# Patient Record
Sex: MALE | Race: WHITE | NOT HISPANIC OR LATINO | URBAN - METROPOLITAN AREA
[De-identification: names, ages, dates, MRNs, and addresses within clinical notes are randomized per-mention and may not be internally consistent; named-entity substitution may affect disease eponyms.]

---

## 2020-08-17 ENCOUNTER — OFFICE (OUTPATIENT)
Dept: URBAN - METROPOLITAN AREA CLINIC 11 | Facility: CLINIC | Age: 45
End: 2020-08-17

## 2020-08-17 VITALS
WEIGHT: 212 LBS | SYSTOLIC BLOOD PRESSURE: 115 MMHG | HEART RATE: 52 BPM | OXYGEN SATURATION: 99 % | DIASTOLIC BLOOD PRESSURE: 64 MMHG | HEIGHT: 73 IN

## 2020-08-17 DIAGNOSIS — K62.89 OTHER SPECIFIED DISEASES OF ANUS AND RECTUM: ICD-10-CM

## 2020-08-17 PROCEDURE — 99202 OFFICE O/P NEW SF 15 MIN: CPT

## 2020-08-17 NOTE — SERVICEHPINOTES
Mr. Griffin is a 44 y/o WM who presents today to discuss colon cancer screening. Over the past few years he has had several friends diagnosed with colon cancer before the age of 50. He has no family history of colon cancer or polyps. He has no change in weight or bowel pattern. There is no melena or hematochezia. He is on overall good health. About once a month he has a brief rectal pain, described as spasm that lasts only a few seconds. Impression/Plan:BR-Rectal pain consistent with proctalgia fugax.BR-Discussed annual hemosures until he turns 49 for colon cancer screening. If positive, will do diagnostic colonoscopy.

## 2021-04-08 ENCOUNTER — OFFICE (OUTPATIENT)
Dept: URBAN - METROPOLITAN AREA CLINIC 11 | Facility: CLINIC | Age: 46
End: 2021-04-08
Payer: COMMERCIAL

## 2021-04-08 DIAGNOSIS — K62.89 OTHER SPECIFIED DISEASES OF ANUS AND RECTUM: ICD-10-CM

## 2021-04-08 PROCEDURE — 82274 ASSAY TEST FOR BLOOD FECAL: CPT | Mod: QW

## 2023-08-28 ENCOUNTER — AMBULATORY SURGICAL CENTER (OUTPATIENT)
Dept: URBAN - METROPOLITAN AREA SURGERY 3 | Facility: SURGERY | Age: 48
End: 2023-08-28
Payer: COMMERCIAL

## 2023-08-28 ENCOUNTER — OFFICE (OUTPATIENT)
Dept: URBAN - METROPOLITAN AREA PATHOLOGY 12 | Facility: PATHOLOGY | Age: 48
End: 2023-08-28
Payer: COMMERCIAL

## 2023-08-28 VITALS
OXYGEN SATURATION: 97 % | SYSTOLIC BLOOD PRESSURE: 127 MMHG | HEART RATE: 53 BPM | HEART RATE: 57 BPM | WEIGHT: 221 LBS | HEIGHT: 74 IN | SYSTOLIC BLOOD PRESSURE: 121 MMHG | SYSTOLIC BLOOD PRESSURE: 120 MMHG | DIASTOLIC BLOOD PRESSURE: 81 MMHG | TEMPERATURE: 97.9 F | RESPIRATION RATE: 19 BRPM | SYSTOLIC BLOOD PRESSURE: 136 MMHG | OXYGEN SATURATION: 98 % | RESPIRATION RATE: 16 BRPM | RESPIRATION RATE: 17 BRPM | OXYGEN SATURATION: 97 % | DIASTOLIC BLOOD PRESSURE: 81 MMHG | DIASTOLIC BLOOD PRESSURE: 80 MMHG | RESPIRATION RATE: 17 BRPM | RESPIRATION RATE: 17 BRPM | TEMPERATURE: 98.5 F | TEMPERATURE: 97.9 F | HEART RATE: 54 BPM | DIASTOLIC BLOOD PRESSURE: 77 MMHG | TEMPERATURE: 97.9 F | HEART RATE: 54 BPM | DIASTOLIC BLOOD PRESSURE: 80 MMHG | DIASTOLIC BLOOD PRESSURE: 68 MMHG | HEART RATE: 57 BPM | DIASTOLIC BLOOD PRESSURE: 81 MMHG | OXYGEN SATURATION: 98 % | RESPIRATION RATE: 19 BRPM | HEART RATE: 54 BPM | TEMPERATURE: 98.5 F | HEART RATE: 50 BPM | SYSTOLIC BLOOD PRESSURE: 121 MMHG | OXYGEN SATURATION: 97 % | SYSTOLIC BLOOD PRESSURE: 136 MMHG | RESPIRATION RATE: 19 BRPM | WEIGHT: 221 LBS | HEART RATE: 57 BPM | HEART RATE: 55 BPM | DIASTOLIC BLOOD PRESSURE: 80 MMHG | HEART RATE: 55 BPM | RESPIRATION RATE: 16 BRPM | DIASTOLIC BLOOD PRESSURE: 68 MMHG | SYSTOLIC BLOOD PRESSURE: 120 MMHG | HEART RATE: 53 BPM | HEART RATE: 50 BPM | SYSTOLIC BLOOD PRESSURE: 129 MMHG | RESPIRATION RATE: 16 BRPM | TEMPERATURE: 98.5 F | SYSTOLIC BLOOD PRESSURE: 127 MMHG | SYSTOLIC BLOOD PRESSURE: 120 MMHG | DIASTOLIC BLOOD PRESSURE: 77 MMHG | OXYGEN SATURATION: 96 % | OXYGEN SATURATION: 96 % | SYSTOLIC BLOOD PRESSURE: 127 MMHG | SYSTOLIC BLOOD PRESSURE: 129 MMHG | HEART RATE: 50 BPM | HEART RATE: 55 BPM | DIASTOLIC BLOOD PRESSURE: 68 MMHG | SYSTOLIC BLOOD PRESSURE: 136 MMHG | OXYGEN SATURATION: 98 % | HEIGHT: 74 IN | OXYGEN SATURATION: 96 % | DIASTOLIC BLOOD PRESSURE: 77 MMHG | SYSTOLIC BLOOD PRESSURE: 129 MMHG | HEIGHT: 74 IN | WEIGHT: 221 LBS | HEART RATE: 53 BPM | SYSTOLIC BLOOD PRESSURE: 121 MMHG

## 2023-08-28 DIAGNOSIS — Z12.11 ENCOUNTER FOR SCREENING FOR MALIGNANT NEOPLASM OF COLON: ICD-10-CM

## 2023-08-28 DIAGNOSIS — K59.9 FUNCTIONAL INTESTINAL DISORDER, UNSPECIFIED: ICD-10-CM

## 2023-08-28 DIAGNOSIS — K63.89 OTHER SPECIFIED DISEASES OF INTESTINE: ICD-10-CM

## 2023-08-28 DIAGNOSIS — K62.89 OTHER SPECIFIED DISEASES OF ANUS AND RECTUM: ICD-10-CM

## 2023-08-28 PROBLEM — K63.3 ULCER OF INTESTINE: Status: ACTIVE | Noted: 2023-08-28

## 2023-08-28 PROCEDURE — 45380 COLONOSCOPY AND BIOPSY: CPT | Mod: 33 | Performed by: INTERNAL MEDICINE

## 2023-08-28 PROCEDURE — 88305 TISSUE EXAM BY PATHOLOGIST: CPT | Performed by: PATHOLOGY

## 2023-08-30 LAB
GASTRO ONE PATHOLOGY: PDF REPORT: (no result)